# Patient Record
Sex: FEMALE | Race: WHITE | NOT HISPANIC OR LATINO | Employment: FULL TIME | URBAN - METROPOLITAN AREA
[De-identification: names, ages, dates, MRNs, and addresses within clinical notes are randomized per-mention and may not be internally consistent; named-entity substitution may affect disease eponyms.]

---

## 2017-10-04 PROBLEM — D75.839 THROMBOCYTOSIS: Status: ACTIVE | Noted: 2017-10-04

## 2017-10-04 PROBLEM — D64.9 ANEMIA: Status: ACTIVE | Noted: 2017-10-04

## 2017-11-21 ENCOUNTER — OFFICE VISIT (OUTPATIENT)
Dept: NEUROLOGY | Facility: CLINIC | Age: 32
End: 2017-11-21
Payer: COMMERCIAL

## 2017-11-21 VITALS
DIASTOLIC BLOOD PRESSURE: 76 MMHG | WEIGHT: 192.44 LBS | SYSTOLIC BLOOD PRESSURE: 116 MMHG | HEART RATE: 84 BPM | HEIGHT: 62 IN | BODY MASS INDEX: 35.41 KG/M2 | RESPIRATION RATE: 16 BRPM

## 2017-11-21 DIAGNOSIS — D75.839 THROMBOCYTOSIS: ICD-10-CM

## 2017-11-21 DIAGNOSIS — G43.109 COMPLICATED MIGRAINE: Primary | ICD-10-CM

## 2017-11-21 PROCEDURE — 99999 PR PBB SHADOW E&M-NEW PATIENT-LVL III: CPT | Mod: PBBFAC,,, | Performed by: PSYCHIATRY & NEUROLOGY

## 2017-11-21 PROCEDURE — 99204 OFFICE O/P NEW MOD 45 MIN: CPT | Mod: S$GLB,,, | Performed by: PSYCHIATRY & NEUROLOGY

## 2017-11-21 NOTE — PROGRESS NOTES
Consult from Dr Roth    HPI: Scott Barry is a 32 y.o. female with history of headaches since she was 12 years old and an episode of different headache this month. She denies aura usually. She has right more than left sided head pain with eye pain and nausea and severe headache with light/sound/smell sensitivity and this lasts hours. OTC NSAIDs and coffee helps. She never before had aura or decreased frequency.  With a first pregnancy she had no headaches. Normally, when not pregnant, she has menstrual trigger for migraine or will have a headache if she sleeps too long or drinks wine or too much salt. Usually with avoiding these triggers she has one headache per month. Previously treximet helps   Patient states she is 11 weeks pregnant for her second child.   She had an episode of dizziness on 11/11/2017 (described as heaviness in the head) and decreased right eye vision (which seemed back) but she could see and she felt numb on the right side of the face. Patient was seen at an acute clinic during this time and was sent to ER and labs were checked. Within 4 hours these symptoms resolved and she had a headache which was on the right side and was not as severe as some migraines have.   Patient states she is has had no further headaches in the past 1.5-2 weeks since spell.   Reports normal BP this pregnancy.  Note she is seeing hematology for thrombocytosis which is improved.   Patient works as a [employer] . She has a 6 year old daughter.   Review of Systems   Constitutional: Negative for fever.   HENT: Negative for nosebleeds.    Eyes: Negative for double vision.   Respiratory: Negative for hemoptysis.    Cardiovascular: Positive for leg swelling.        Mild dependent edema at times   Gastrointestinal: Negative for blood in stool.   Genitourinary: Negative for hematuria.   Musculoskeletal: Negative for falls.   Skin: Negative for rash.   Neurological: Positive for headaches.    Psychiatric/Behavioral: The patient does not have insomnia.          I have reviewed all of this patient's past medical and surgical histories as well as family and social histories and active allergies and medications as documented in the electronic medical record.        Exam:  Gen Appearance, well developed/nourished in no apparent distress  CV: 2+ distal pulses with no edema or swelling  Neuro:  MS: Awake, alert, oriented to place, person, time, situation. Sustains attention. Recent/remote memory intact, Language is full to spontaneous speech/naming/comprehension. Fund of Knowledge is full  CN: Optic discs are flat with normal vasculature, PERRL, Extraoccular movements and visual fields are full. Normal facial sensation and strength, Hearing symmetric, Tongue and Palate are midline and strong. Shoulder Shrug symmetric and strong.  Motor: Normal bulk, tone, no abnormal movements. 5/5 strength bilateral upper/lower extremities with 2+ reflexes and no clonus  Sensory: symmetric to  temp, and vibration,  Romberg negative  Cerebellar: Finger-nose,Heal-shin, Rapid alternating movements intact  Gait: Normal stance, no ataxia      Labs: 11/2017 CMP, CBC reviewed (mild platelet elevation noted)  10/2017 TSh, HIV, RPR, A1C normal      Assessment/Plan: Scott Barry is a 32 y.o. female currently 11 weeks pregnant who has a long history of migraine without aura who suffered a new headache type this month including visual aura, right facial numbness and dizziness followed by headache.   I recommend:   1. Reassuring is her long history of headache and headache following the spell described above. Her neurological exam is normal. This suggests complicated migraine headache.   2. Can hold on MRI brain as long as she does not have more frequent headaches or new symptoms. She will notify me for any changes.   3. Prior to Pregnancy, treximet helped. She can currently use Rest and Tylenol PRN. For any increased headaches  frequency a prevention med can be considered.   4. Note she is seeing hematology for thrombocytosis which is improved. She requires an ASA daily for this. No signs of CVA currently.   RTC 5 months  Patient was urged to call in the interim for any new or worse symptoms.   CC:  Dr Roth

## 2018-04-24 ENCOUNTER — OFFICE VISIT (OUTPATIENT)
Dept: NEUROLOGY | Facility: CLINIC | Age: 33
End: 2018-04-24
Payer: COMMERCIAL

## 2018-04-24 VITALS
SYSTOLIC BLOOD PRESSURE: 112 MMHG | HEIGHT: 62 IN | RESPIRATION RATE: 16 BRPM | WEIGHT: 194.88 LBS | DIASTOLIC BLOOD PRESSURE: 78 MMHG | HEART RATE: 90 BPM | BODY MASS INDEX: 35.86 KG/M2

## 2018-04-24 DIAGNOSIS — D75.839 THROMBOCYTOSIS: ICD-10-CM

## 2018-04-24 DIAGNOSIS — G43.109 COMPLICATED MIGRAINE: Primary | ICD-10-CM

## 2018-04-24 PROCEDURE — 99213 OFFICE O/P EST LOW 20 MIN: CPT | Mod: S$GLB,,, | Performed by: PSYCHIATRY & NEUROLOGY

## 2018-04-24 PROCEDURE — 99999 PR PBB SHADOW E&M-EST. PATIENT-LVL III: CPT | Mod: PBBFAC,,, | Performed by: PSYCHIATRY & NEUROLOGY

## 2018-04-24 NOTE — PROGRESS NOTES
HPI: Scott Barry is a 32 y.o. female currently 32 weeks pregnant who has a long history of migraine without aura who suffered a new headache type in early pregnancy including visual aura, right facial numbness and dizziness followed by headache.   Patient states since the last visit, he has only had a couple of headaches which have been well managed with Fioricet  Currently, no aura and no numbness. No dizziness associated with the headache.   She is being treated for gestational diabetes currently   Waiting to further evaluate thrombocytosis post pregnancy.  Expecting a baby boy, Carlos, next month.     Review of Systems   Constitutional: Negative for fever.   HENT: Negative for nosebleeds.    Eyes: Negative for double vision.   Respiratory: Negative for hemoptysis.    Cardiovascular: Negative for leg swelling.   Gastrointestinal: Negative for blood in stool.   Genitourinary: Negative for hematuria.   Musculoskeletal: Negative for falls.   Skin: Negative for rash.   Neurological: Positive for headaches. Negative for sensory change, focal weakness and seizures.   Psychiatric/Behavioral: The patient does not have insomnia.          I have reviewed all of this patient's past medical and surgical histories as well as family and social histories and active allergies and medications as documented in the electronic medical record.    Exam:  Gen Appearance, well developed/nourished in no apparent distress  CV: 2+ distal pulses with no edema or swelling  Neuro:  MS: Awake, alert, oriented to place, person, time, situation. Sustains attention. Recent/remote memory intact, Language is full to spontaneous speech/comprehension. Fund of Knowledge is full  CN: Optic discs are flat with normal vasculature, PERRL, Extraoccular movements and visual fields are full. Normal facial sensation and strength, Hearing symmetric, Tongue and Palate are midline and strong. Shoulder Shrug symmetric and strong.  Motor: Normal bulk, tone, no  abnormal movements. 5/5 strength bilateral upper/lower extremities with 2+ reflexes and no clonus  Sensory: symmetric to  temp, and vibration,  Romberg negative  Cerebellar: Finger-nose,Heal-shin, Rapid alternating movements intact  Gait: Normal stance, no ataxia      Labs: 11/2017 CMP, CBC reviewed (mild platelet elevation noted)  10/2017 TSh, HIV, RPR, A1C normal      Assessment/Plan: Scott Barry is a 32 y.o. female currently 32 weeks pregnant who has a long history of migraine without aura who suffered a new headache type in early in this pregnancy including visual aura, right facial numbness and dizziness followed by headache.   I recommend:   1. Reassuring is her long history of headache and headache following neurological symptoms.  Her neurological exam is normal. This suggests complicated migraine headache.   2. Avoid MRI brain as long as she does not have more frequent headaches or new symptoms. She will notify me for any changes.   3. Prior to Pregnancy, treximet helped. She can currently use Rest and Tylenol PRN. For any increased headaches frequency a prevention med can be considered- she does plan to breastfeed.   4. Seeing hematology for thrombocytosis of note.    RTC 6 months

## 2018-05-30 PROBLEM — O24.419 GESTATIONAL DIABETES MELLITUS (GDM) AFFECTING SECOND PREGNANCY: Status: ACTIVE | Noted: 2018-05-30

## 2018-05-31 PROBLEM — O24.419 GESTATIONAL DIABETES MELLITUS (GDM) AFFECTING SECOND PREGNANCY: Status: RESOLVED | Noted: 2018-05-30 | Resolved: 2018-05-31

## 2021-10-13 PROBLEM — T83.32XA IUD STRINGS LOST: Status: RESOLVED | Noted: 2021-10-13 | Resolved: 2021-10-13

## 2021-10-13 PROBLEM — D64.9 ANEMIA: Status: RESOLVED | Noted: 2017-10-04 | Resolved: 2021-10-13

## 2021-10-13 PROBLEM — T83.32XA IUD STRINGS LOST: Status: ACTIVE | Noted: 2021-10-13
